# Patient Record
(demographics unavailable — no encounter records)

---

## 2025-05-27 NOTE — HISTORY OF PRESENT ILLNESS
[de-identified] : Patient is a 6-year-old female here for evaluation bilateral hips.  Patient states she has mild right hip pain over the past few months or so with no injury or trauma patient states she was also having mild left hip pain which she has surgery history of total hip replacement 2020.  Patient states over the past few months she has also been being treated for a fractured left ankle and she was in a cam walker boot.  Denies numbness tingling denies instability  Bilateral hip exam: No effusion no ecchymosis no erythema no tense palpation good range of motion good strength no gross stability neurovascular intact  X-ray bilateral hips 2 views for comparison: No acute fractures consultations, dislocations.  Mild to moderate osteoarthritis right hip.  Left hip surgical hardware intact ankle position  Discussed with patient in detail she has mild osteoarthritis of the right hip, left hip replacement is intact in good position.  Discussed with patient she likely is compensating secondary to being in cam walker boot with her fractured ankle.  Advised Tylenol as needed for pain follow-up in 4 to 6 months for reevaluation if still in pain patient understands and agrees with plan

## 2025-06-12 NOTE — PHYSICAL EXAM
[MA] : MA [FreeTextEntry2] : ROSALIE christie [Appropriately responsive] : appropriately responsive [Alert] : alert [No Acute Distress] : no acute distress [Oriented x3] : oriented x3 [Labia Majora] : normal [Labia Minora] : normal [Normal] : normal [Uterine Adnexae] : normal [External Hemorrhoid] : external hemorrhoid

## 2025-06-12 NOTE — DISCUSSION/SUMMARY
[FreeTextEntry1] : no genital prolapse what pt is feeling are large external hemorrhoids medication prescribed follow up with GI, also needs colonoscopy stool softener as needed to keep stools a soft consistency follow up in Jan 2026 for well woman visit

## 2025-06-12 NOTE — HISTORY OF PRESENT ILLNESS
[FreeTextEntry1] : pt presents with CC of bulge in vagina, worse when she pushes to have a BM denies any vaginal bleeding [postmenopausal] : postmenopausal [N] : Patient is not sexually active [Y] : Positive pregnancy history [[facility]Total] : 2 [facility] [Currently In Menopause] : currently in menopause [Post-Menopause, No Sxs] : post-menopausal, currently without symptoms [Menopause Age: ____] : age at menopause was [unfilled] [No] : Patient does not have concerns regarding sex [Previously active] : previously active